# Patient Record
(demographics unavailable — no encounter records)

---

## 2024-10-14 NOTE — PHYSICAL EXAM
[General Appearance - Alert] : alert [General Appearance - In No Acute Distress] : in no acute distress [Sclera] : the sclera and conjunctiva were normal [Extraocular Movements] : extraocular movements were intact [PERRL With Normal Accommodation] : pupils were equal in size, round, and reactive to light [Oropharynx] : the oropharynx was normal [] : no respiratory distress [Exaggerated Use Of Accessory Muscles For Inspiration] : no accessory muscle use [Auscultation Breath Sounds / Voice Sounds] : lungs were clear to auscultation bilaterally [Heart Rate And Rhythm] : heart rate was normal and rhythm regular [Heart Sounds] : normal S1 and S2 [Murmurs] : no murmurs [Edema] : there was no peripheral edema [Abdomen Soft] : soft [Abdomen Tenderness] : non-tender [No Spinal Tenderness] : no spinal tenderness [Musculoskeletal - Swelling] : no joint swelling seen [No Focal Deficits] : no focal deficits [Oriented To Time, Place, And Person] : oriented to person, place, and time [Impaired Insight] : insight and judgment were intact [FreeTextEntry1] : +scaley plaques most prominent on back, also present on forearms and elbows, abdomen, LE. +nail pitting

## 2024-10-14 NOTE — ASSESSMENT
[FreeTextEntry1] : 46F PMH psoriasis and PsA here for follow up  #psoriatic arthritis -dx 2015 with diffuse joint pains, currently with L knee/lower back/b/l elbow pain. No synovitis or effusion on exam, with diffuse psoriasis on back, forearms, elbows, LE, abd that is improved from prior after steroid ointment. -No tape measure in clinic, finger to toe approximately 10 inches, schrober <5cm in forward flexion -knee XR 9/2024 with maintained joint spaces, no erosions. Hip XR 9/2024 with preserved sacroiliac joints -Pt was on stelara 2018 but only received 2 doses, pt reports improvement while on stelara -Pt approved for stelara, awaiting shipment  #prescribed INH/pyridoxine -Pt had ED visit 9/2 to [place] [facility] and discharge paperwork noted INH and pyrodxine. Upon clarification, ED doctor stated it was past medication listed on system. Pt denies knowledge of testing positive for TB in the past, no TB symptoms, and has not taken INH or pyridoxine before. Quant gold negative 9/2024 and CXR clear.   Plan: -As pt only had two doses of stelara, it remains an option for PsA. Pt is approved for stelara, awaiting shipment. Discussed with pt to return to clinic in 2 weeks for injection.  Case discussed with Dr. Shereen Nava  Rheumatology Fellow

## 2024-10-14 NOTE — HISTORY OF PRESENT ILLNESS
[Skin Lesions] : skin lesions [Arthralgias] : arthralgias [Morning Stiffness] : morning stiffness [Visual Changes] : visual changes [Dry Eyes] : dry eyes [FreeTextEntry1] : 46F PMH psoriasis/PsA here for follow up.  Dx PsA 2015, had arthritis in hands, wrists, ankles, knees, tendonitis, nail pitting Was on MTX, per patient no improvement. Then trialed on stelara around 7437-2089, however pt has only received two doses. States that her symptoms improved while on stelara.  Pt reports joint pain in L knee, lower back and intermittently on ankles. Pain used to be intermittent but now more constant and has been using motrin. Endorses stiffness but does not last. No eye pain but had pain in back of eye, had optho visit 7/2024 and was told she has dry eyes. No IBD symptoms.  10/14/2024: Pt reports similar joint pains as previously, especially L knee and elbows. Has been taking motrin, was not able to afford diclofenac. Psoriasis improving on steroid ointment. Denies fevers, cough, SOB, abd pain. Has not received stelara yet but received letter stating she was approved for medication. Discussed with pharmacist Deedee during visit, pt should be receiving a call today about shipment. [Anorexia] : no anorexia [Weight Loss] : no weight loss [Malaise] : no malaise [Fever] : no fever [Chills] : no chills [Fatigue] : no fatigue [Depression] : no depression [Malar Facial Rash] : no malar facial rash [Skin Nodules] : no skin nodules [Oral Ulcers] : no oral ulcers [Cough] : no cough [Dry Mouth] : no dry mouth [Dysphonia] : no dysphonia [Dysphagia] : no dysphagia [Shortness of Breath] : no shortness of breath [Chest Pain] : no chest pain [Joint Swelling] : no joint swelling [Joint Warmth] : no joint warmth [Joint Deformity] : no joint deformity [Decreased ROM] : no decreased range of motion [Falls] : no falls [Difficulty Standing] : no difficulty standing [Difficulty Walking] : no difficulty walking [Dyspnea] : no dyspnea [Myalgias] : no myalgias [Muscle Weakness] : no muscle weakness [Muscle Spasms] : no muscle spasms [Muscle Cramping] : no muscle cramping [Eye Pain] : no eye pain [Eye Redness] : no eye redness

## 2024-10-14 NOTE — REASON FOR VISIT
[Follow-Up: _____] : a [unfilled] follow-up visit [Family Member] : family member [Time Spent: ____ minutes] : Total time spent using  services: [unfilled] minutes. The patient's primary language is not English thus required  services. [Interpreters_IDNumber] : [id number] [TWNoteComboBox1] : [place]ian

## 2024-10-15 NOTE — REVIEW OF SYSTEMS
[Recent Weight Gain (___ Lbs)] : recent weight gain: [unfilled] lbs [Blurred Vision] : blurred vision [Joint Pain] : joint pain [Negative] : Heme/Lymph [All other systems negative] : All other systems negative [TextEntry] :  REVIEW OF SYSTEMS: General: No fatigue, no weight gain, no weight loss Respiratory: No cough, no shortness of breath  Cardiovascular: No chest pain, no palpitations, no leg swelling  Gastrointestinal: No nausea, no vomiting, no constipation, no diarrhea  Musculoskeletal: No muscle aches, no joint pain, no recent fractures Neurologic: No tremors, no syncopal episodes Psychiatric: The patient is not currently nervous or anxious  Endocrine: No thyroid/neck swelling, no heat/cold intolerance   The review of systems is otherwise negative except as noted in HPI. [Fatigue] : no fatigue [FreeTextEntry3] : saw ophthalmologist, no DR [FreeTextEntry9] : follows with rheumatology [de-identified] : had suicidal ideation that resolved as per HPI

## 2024-10-15 NOTE — END OF VISIT
[] : Fellow [FreeTextEntry3] : DM2, fairly well controlled with mild fasting hyperglycemia, will increase basaglar to 34 units. Intolerant of Trulicity dose increase due to depressed mood, resolved with lower dose. LDL 86 on high dose statin with low HDL, will add Zetia. Hx of subcm nodules 2022, will repeat sono.

## 2024-10-15 NOTE — HISTORY OF PRESENT ILLNESS
[FreeTextEntry1] : 46F with T2DM, previously uncontrolled requiring insulin, HTN, HLD, prior low AM cortisol level, and thyroid nodules, presents for follow up.  # ID: [id number] utilized for this encounter  Interval history 10/15/24 visit: Patient presents to the clinic for follow up. No acute complaints. Current regimen: Basaglar 30 units qhs, Humalog 27 units before breakfast, 15 units before lunch and dinner, Trulicity 1.5 mg once weekly, metformin 1,000 mg BID. She has been on this regimen since August 2024.  She tried the Trulicity 3.0 mg once weekly after her last visit but she could not tolerate due to adverse effects. Had suicidal ideation that went away when she went back to the 1.5 mg once weekly dose.  FS:  Fasting AM: 212, 164, 239, 179, 196, 225, 168 Pre-lunch 128, 160, 182 Post-dinner 185 Bedtime: 219, 214, 175, 177, 205 Most recent A1C was 6.8% on Aug 9, 2024 A1C today 10/15/24 7.5% Compliant with rosuvastatin 40 mg at bedtime Denies symptoms of neuropathy.  She states that she saw an ophthalmologist within the year and was told that she does not have diabetic retinopathy.  Last UACR 2/2024 was negative.   #T2DM Diagnosed Oct 2021 when A1c was 12% after being evaluated for ACS r/o (not ACS).  A1c: A1s 10.4% in 1/2024, previously 12.1%  Diet: Coffee with sugar/milk, egg, beef, chicken, beans, small amount of rice, salad, fruit Sodas on occasion  Complications: No neuropathy or macrovascular complications  #HLD / hypertriglyceridemia: On Rosuvastatin 40 mg at bedtime - compliant LDL 76 in 5/2023 LDL 86 9/2024  #Previously abnormal AM cortisol had soft/low blood pressure/orthostatic hypotension in the hospital, started on midodrine in Oct/Nov 2021 AM Cortisol level 6.8 in hospital, repeat outpatient was 6.1 Stim test in Jan 2023 was robust, ruling out AI  #Thyroid nodule US in May 2022 revealed small Right sided thyroid nodules, RMP 8mm nodule w/ coarse calcification and RLP 7mm spongiform nodule Last TSH in June 2022 wnl No prior personal or family hx of Thyroid CA or other head/neck cancers says she recalls being informed about this many years ago, not while in this country. no prior hx of thyroid hormone abnormalities No hx of Head/neck RT Had hx of dysphagia symptoms, possibly GERD related.

## 2024-10-15 NOTE — ASSESSMENT
[FreeTextEntry1] : 46F with T2DM uncontrolled on insulin, HTN, HLD, thyroid nodules presents for follow up visit  #T2DM, uncontrolled A1C today 10/15/24 7.5%, previously 6.8% 8/2024 but with overnight hypoglycemia, before that A1s 10.4% in 1/2024, previously 12.1% Current Regimen: Basaglar 30 units QHs, Humalog 27 units with breakfast, 15 units with lunch and dinner, metformin 1000mg BID, Trulicity 1.5mg sq weekly PLAN: - Increase basaglar to 34 units qhs - continue humalog to 27 units before breakfast, 15 units before lunch/dinner  - Continue Trulicity 1.5 mg once weekly on Sundays - Continue Metformin 1000 mg BID (on [facility], all meds sent there) - hx of UTI, vaginal sx - avoid SGLT2i - Refills for glucose testing supplies as needed - Labs at next visit - Log fingersticks and bring to next visit  #HLD / hypertriglyceridemia: LDL above goal (86 in Sep 2024) - Continue rosuvastatin 40mg qhs - Add Zetia 10 mg once daily qhs  #SubCM R. Thyroid nodules Feb 2022: Right 0.79 cm mixed nodule, 7mm left spongiform nodule - repeat US Thyroid   #low BP with AM cortisol in indeterminant range (6.8 >> 6.1) stim test performed Jan 2023, robust response. Baseline cortisol 24 30 mins post cosyntropin Cortisol 21 - no further workup needed  Meds renewed as needed   RTC in 4 months   Case discussed with Dr. Lety Mello, DO PGY-4 endocrine fellow

## 2024-10-15 NOTE — PHYSICAL EXAM
[TextEntry] : PHYSICAL EXAMINATION: Vital signs from today's encounter reviewed.  GENERAL: No acute distress, clinically eukinetic, normal appearance HEAD: Normocephalic, atraumatic EYES: conjunctivae are pink and moist, no icterus, no proptosis  NECK: thyroid is not enlarged/nodular on palpation, non-tender, no adenopathy CARDIOVASCULAR: well-perfused extremities, no peripheral edema RESPIRATORY: normal chest expansion with good pulmonary effort, no acute respiratory distress MUSCULOSKELETAL: no swelling, normal range of motion, normal gait SKIN: tender/hard nodule on left lower back, no surrounding erythema  NEUROLOGIC: alert and oriented, no evident focal deficits, no tremors  PSYCHIATRIC: mood and affect are normal ENDOCRINE: No obvious stigmata of Cushing's or acromegaly present

## 2024-10-28 NOTE — HISTORY OF PRESENT ILLNESS
[Skin Lesions] : skin lesions [Arthralgias] : arthralgias [Morning Stiffness] : morning stiffness [Visual Changes] : visual changes [Dry Eyes] : dry eyes [FreeTextEntry1] : 46F PMH psoriasis/PsA here for follow up.  Dx PsA 2015, had arthritis in hands, wrists, ankles, knees, tendonitis, nail pitting Was on MTX, per patient no improvement. Then trialed on stelara around 0225-4337, however pt has only received two doses. States that her symptoms improved while on stelara.  Pt reports joint pain in L knee, lower back and intermittently on ankles. Pain used to be intermittent but now more constant and has been using motrin. Endorses stiffness but does not last. No eye pain but had pain in back of eye, had optho visit 7/2024 and was told she has dry eyes. No IBD symptoms.  10/14/2024: Pt reports similar joint pains as previously, especially L knee and elbows. Has been taking motrin, was not able to afford diclofenac. Psoriasis improving on steroid ointment. Denies fevers, cough, SOB, abd pain. Has not received stelara yet but received letter stating she was approved for medication. Discussed with pharmacist Deedee during visit, pt should be receiving a call today about shipment.  10/28: Pt presents for stelara injection. Has L knee medial side pain, R elbow pain, and lower back pain [Anorexia] : no anorexia [Weight Loss] : no weight loss [Malaise] : no malaise [Fever] : no fever [Chills] : no chills [Fatigue] : no fatigue [Depression] : no depression [Malar Facial Rash] : no malar facial rash [Skin Nodules] : no skin nodules [Oral Ulcers] : no oral ulcers [Cough] : no cough [Dry Mouth] : no dry mouth [Dysphonia] : no dysphonia [Dysphagia] : no dysphagia [Shortness of Breath] : no shortness of breath [Chest Pain] : no chest pain [Joint Swelling] : no joint swelling [Joint Warmth] : no joint warmth [Joint Deformity] : no joint deformity [Decreased ROM] : no decreased range of motion [Falls] : no falls [Difficulty Standing] : no difficulty standing [Difficulty Walking] : no difficulty walking [Dyspnea] : no dyspnea [Myalgias] : no myalgias [Muscle Weakness] : no muscle weakness [Muscle Spasms] : no muscle spasms [Muscle Cramping] : no muscle cramping [Eye Pain] : no eye pain [Eye Redness] : no eye redness

## 2024-10-28 NOTE — ASSESSMENT
[FreeTextEntry1] : 46F PMH psoriasis and PsA here for follow up  #psoriatic arthritis -dx 2015 with diffuse joint pains, currently with L knee/lower back/b/l elbow pain. No synovitis or effusion on exam, with diffuse psoriasis on back, forearms, elbows, LE, abd that is improved from prior after steroid ointment. -No tape measure in clinic, finger to toe approximately 10 inches, schrober <5cm in forward flexion -knee XR 9/2024 with maintained joint spaces, no erosions. Hip XR 9/2024 with preserved sacroiliac joints -Pt was on stelara 2018 but only received 2 doses, pt reports improvement while on stelara -Pt approved for stelara, 10/28: pt given stelara injection in clinic  #prescribed INH/pyridoxine -Pt had ED visit 9/2 to [place] [facility] and discharge paperwork noted INH and pyrodxine. Upon clarification, ED doctor stated it was past medication listed on system. Pt denies knowledge of testing positive for TB in the past, no TB symptoms, and has not taken INH or pyridoxine before. Quant gold negative 9/2024 and CXR clear.   Plan: - 1st dose of Stelara administered today (10/28) - pt to return in 4 weeks for second injection, then every 12 weeks afterwards - derm eval for possible tinea vesicolor  RTC 4 weeks Case discussed with Dr. Shereen Nava  Rheumatology Fellow

## 2024-10-28 NOTE — PHYSICAL EXAM
[General Appearance - Alert] : alert [General Appearance - In No Acute Distress] : in no acute distress [Sclera] : the sclera and conjunctiva were normal [PERRL With Normal Accommodation] : pupils were equal in size, round, and reactive to light [Extraocular Movements] : extraocular movements were intact [Oropharynx] : the oropharynx was normal [] : no respiratory distress [Exaggerated Use Of Accessory Muscles For Inspiration] : no accessory muscle use [Auscultation Breath Sounds / Voice Sounds] : lungs were clear to auscultation bilaterally [Heart Rate And Rhythm] : heart rate was normal and rhythm regular [Heart Sounds] : normal S1 and S2 [Murmurs] : no murmurs [Edema] : there was no peripheral edema [Abdomen Soft] : soft [Abdomen Tenderness] : non-tender [No Spinal Tenderness] : no spinal tenderness [Musculoskeletal - Swelling] : no joint swelling seen [No Focal Deficits] : no focal deficits [Oriented To Time, Place, And Person] : oriented to person, place, and time [Impaired Insight] : insight and judgment were intact [FreeTextEntry1] : +scaley plaques most prominent on back, also present on forearms and elbows, abdomen, LE. +nail pitting. Also with hypopigmented areas on arms

## 2024-10-28 NOTE — REASON FOR VISIT
[Procedure: _________] : a [unfilled] procedure visit [Time Spent: ____ minutes] : Total time spent using  services: [unfilled] minutes. The patient's primary language is not English thus required  services. [Interpreters_IDNumber] : [id number] [TWNoteComboBox1] : [place]

## 2024-10-28 NOTE — REASON FOR VISIT
[Procedure: _________] : a [unfilled] procedure visit [Time Spent: ____ minutes] : Total time spent using  services: [unfilled] minutes. The patient's primary language is not English thus required  services. [Interpreters_IDNumber] : [id number] [TWNoteComboBox1] : [place]i

## 2024-10-28 NOTE — HISTORY OF PRESENT ILLNESS
[Skin Lesions] : skin lesions [Arthralgias] : arthralgias [Morning Stiffness] : morning stiffness [Visual Changes] : visual changes [Dry Eyes] : dry eyes [FreeTextEntry1] : 46F PMH psoriasis/PsA here for follow up.  Dx PsA 2015, had arthritis in hands, wrists, ankles, knees, tendonitis, nail pitting Was on MTX, per patient no improvement. Then trialed on stelara around 2736-7061, however pt has only received two doses. States that her symptoms improved while on stelara.  Pt reports joint pain in L knee, lower back and intermittently on ankles. Pain used to be intermittent but now more constant and has been using motrin. Endorses stiffness but does not last. No eye pain but had pain in back of eye, had optho visit 7/2024 and was told she has dry eyes. No IBD symptoms.  10/14/2024: Pt reports similar joint pains as previously, especially L knee and elbows. Has been taking motrin, was not able to afford diclofenac. Psoriasis improving on steroid ointment. Denies fevers, cough, SOB, abd pain. Has not received stelara yet but received letter stating she was approved for medication. Discussed with pharmacist Deedee during visit, pt should be receiving a call today about shipment.  10/28: Pt presents for stelara injection. Has L knee medial side pain, R elbow pain, and lower back pain [Anorexia] : no anorexia [Weight Loss] : no weight loss [Malaise] : no malaise [Fever] : no fever [Chills] : no chills [Fatigue] : no fatigue [Depression] : no depression [Malar Facial Rash] : no malar facial rash [Skin Nodules] : no skin nodules [Oral Ulcers] : no oral ulcers [Cough] : no cough [Dry Mouth] : no dry mouth [Dysphonia] : no dysphonia [Dysphagia] : no dysphagia [Shortness of Breath] : no shortness of breath [Chest Pain] : no chest pain [Joint Swelling] : no joint swelling [Joint Warmth] : no joint warmth [Joint Deformity] : no joint deformity [Decreased ROM] : no decreased range of motion [Falls] : no falls [Difficulty Standing] : no difficulty standing [Difficulty Walking] : no difficulty walking [Dyspnea] : no dyspnea [Myalgias] : no myalgias [Muscle Weakness] : no muscle weakness [Muscle Spasms] : no muscle spasms [Muscle Cramping] : no muscle cramping [Eye Pain] : no eye pain [Eye Redness] : no eye redness

## 2024-10-28 NOTE — ASSESSMENT
[FreeTextEntry1] : 46F PMH psoriasis and PsA here for follow up  #psoriatic arthritis -dx 2015 with diffuse joint pains, currently with L knee/lower back/b/l elbow pain. No synovitis or effusion on exam, with diffuse psoriasis on back, forearms, elbows, LE, abd that is improved from prior after steroid ointment. -No tape measure in clinic, finger to toe approximately 10 inches, schrober <5cm in forward flexion -knee XR 9/2024 with maintained joint spaces, no erosions. Hip XR 9/2024 with preserved sacroiliac joints -Pt was on stelara 2018 but only received 2 doses, pt reports improvement while on stelara -Pt approved for stelara, 10/28: pt given stelara injection in clinic  #prescribed INH/pyridoxine -Pt had ED visit 9/2 to [place] [facility] and discharge paperwork noted INH and pyrodxine. Upon clarification, ED doctor stated it was past medication listed on system. Pt denies knowledge of testing positive for TB in the past, no TB symptoms, and has not taken INH or pyridoxine before. Quant gold negative 9/2024 and CXR clear.   Plan: - 1st dose of Stelara administered today (10/28) - pt to return in 4 weeks for second injection, then every 12 weeks afterwards - derm eval for possible tinea vesicolor  RTC 4 weeks Case discussed with Dr. Shereen Nava  Rheumatology Fellow

## 2024-10-30 NOTE — HISTORY OF PRESENT ILLNESS
[FreeTextEntry1] : Follow up  visit for chronic medical conditions. [de-identified] : The patient is a 46-year-old F with PMH significant for T2DM, psoriasis, and hypotension who presents today for a follow up visit for chronic medical conditions.  # Today the patient is c/o occasional RLQ pain. She reports that she feels like a ball is bulging out at times in her right groin. She denies fever, chills, denies sudden sharp pain, denies inability to move bowels. Patient is passing flatus normally. She has chronic constipation but still able to move bowels.   #T2DM- Following Endo  A1c 10/2024- 7.5 Endorsed medication compliance w/ basaglar- 34 units at bedtime Premeal insulin- Humalog 27before breakfast/15before lunch /15 before dinner. Metformin 1000 BID Trulicity 1.5 mg once a week- endorsed compliance.  Repair Type: Partial Purse String Closure (Intermediate)

## 2024-10-30 NOTE — ASSESSMENT
[FreeTextEntry1] : The plan of care was discussed with the patient in full detail. She verbalized understanding and in agreement with the plan of care.  RTC after Abdominal and pelvic US

## 2024-10-30 NOTE — HISTORY OF PRESENT ILLNESS
[FreeTextEntry1] : Follow up  visit for chronic medical conditions. [de-identified] : The patient is a 46-year-old F with PMH significant for T2DM, psoriasis, and hypotension who presents today for a follow up visit for chronic medical conditions.  # Today the patient is c/o occasional RLQ pain. She reports that she feels like a ball is bulging out at times in her right groin. She denies fever, chills, denies sudden sharp pain, denies inability to move bowels. Patient is passing flatus normally. She has chronic constipation but still able to move bowels.   #T2DM- Following Endo  A1c 10/2024- 7.5 Endorsed medication compliance w/ basaglar- 34 units at bedtime Premeal insulin- Humalog 27before breakfast/15before lunch /15 before dinner. Metformin 1000 BID Trulicity 1.5 mg once a week- endorsed compliance.

## 2024-10-30 NOTE — PHYSICAL EXAM
[Normal] : no acute distress, well nourished, well developed and well-appearing [No Respiratory Distress] : no respiratory distress  [No Accessory Muscle Use] : no accessory muscle use [Clear to Auscultation] : lungs were clear to auscultation bilaterally [Normal Rate] : normal rate  [Regular Rhythm] : with a regular rhythm [Normal S1, S2] : normal S1 and S2 [Pedal Pulses Present] : the pedal pulses are present [No Edema] : there was no peripheral edema [Soft] : abdomen soft [Non Tender] : non-tender [Normal Bowel Sounds] : normal bowel sounds [Normal Affect] : the affect was normal [Alert and Oriented x3] : oriented to person, place, and time [Normal Mood] : the mood was normal [Normal Insight/Judgement] : insight and judgment were intact [de-identified] : no hernia noted on physical exam.

## 2024-10-30 NOTE — PHYSICAL EXAM
[Normal] : no acute distress, well nourished, well developed and well-appearing [No Respiratory Distress] : no respiratory distress  [No Accessory Muscle Use] : no accessory muscle use [Clear to Auscultation] : lungs were clear to auscultation bilaterally [Normal Rate] : normal rate  [Regular Rhythm] : with a regular rhythm [Normal S1, S2] : normal S1 and S2 [Pedal Pulses Present] : the pedal pulses are present [No Edema] : there was no peripheral edema [Soft] : abdomen soft [Non Tender] : non-tender [Normal Bowel Sounds] : normal bowel sounds [Normal Affect] : the affect was normal [Alert and Oriented x3] : oriented to person, place, and time [Normal Mood] : the mood was normal [Normal Insight/Judgement] : insight and judgment were intact [de-identified] : no hernia noted on physical exam.

## 2024-11-15 NOTE — HISTORY OF PRESENT ILLNESS
[FreeTextEntry1] : destiney PsO [de-identified] : History obtained with assistance of [place] phone   ARLENE LARA is a 44 year old F who presents for f/u for PsO/PsA as below. LV 9/2024 Since LV: saw Rheum, started stelara injection #1 given 10/28, will have second dose for loading with rheum coming up 12/2 Today presenting for scattered white spots on arms. Non-painful, not itchy  April 2023: At that time, plan was to review labs and initiate Humira given minimal improvement on MTX. Has not been able to make it to recent rheumatology appointments. PCP prescribed triamcinolone ointment which is not working to improve rash. Patient notes that joint discomfort and skin rash have become bothersome to point of interfering with walking, sleeping, working, etc. Interested in systemic medication at this time  History: Psoriasis - scalp, lower back, extensor surfaces on elbows, knees, dispersed on trunk, <5% - since pandemic started ~ end of 2020, problem has returned  - hasn't been able to use any topicals as ran out and had not returned to clinic  - endorsing new joint pains, different than previous, in fingers and feet, notes occurs sometimes in morning, sometimes at night, but in the morning feels extreme joint stiffness - previously on Stelara (03/2018- ~2019) as had BSA ~20% and was undergoing evaluation for PsA (no joint disease per rheumatology evaluation in 2019) and MTX was cost-prohibitive (~30/month). Had good response while on Stelara previously

## 2024-11-15 NOTE — ASSESSMENT
[FreeTextEntry1] : # Psoriasis, prior~25% BSA, chronic, improved, now with #post-inflammatory hypopigmentation - Discussed nature and course of chronic condition. - previously on Stelara (03/2018- ~2019) through patient assistance program, stopped due to lost to f/u, also without psoriatic arthritis. Prescribed MTX by rheum but stopped. - Patient restarted on Stelara, 1st injection of loading dose 10/28 with Rheumatology - Recommend cont follow-up with rheum regarding completing loading dose and continue with monthly injections - For flaring of skin can continueto  Apply topical triamcinolone 0.1% ointment to AA BID PRN for maximum of 2 weeks on, then take 1 week break before re-using if needed - We discussed that clinically appears as post inflammatory hypopigmentation but can treat for possible tinea versicolor as there is evidence of involvement on the back  #Tinea versicolor, trunk arms #PIH on arms Discussed nature, chronicity and unpredictable course PLAN  - Ketoconazole 2% shampoo 2-3 times weekly. Instructed to leave in skin for 5-10 minutes then rinse. SED.   # High risk medication use, - baseline labs WNL  - continue to monitor with rheum  RTC 6 months

## 2024-12-02 NOTE — PROCEDURE
[Today's Date:] : Date: [unfilled] [Patient] : the patient [Risks] : risks [Benefits] : benefits [Consent Obtained] : written consent was obtained prior to the procedure and is detailed in the patient's record [Therapeutic] : therapeutic [#1 Site: ______] : #1 site identified in the [unfilled]

## 2024-12-02 NOTE — PHYSICAL EXAM
[General Appearance - Alert] : alert [General Appearance - In No Acute Distress] : in no acute distress [Sclera] : the sclera and conjunctiva were normal [PERRL With Normal Accommodation] : pupils were equal in size, round, and reactive to light [Extraocular Movements] : extraocular movements were intact [Oropharynx] : the oropharynx was normal [] : no respiratory distress [Exaggerated Use Of Accessory Muscles For Inspiration] : no accessory muscle use [Auscultation Breath Sounds / Voice Sounds] : lungs were clear to auscultation bilaterally [Heart Rate And Rhythm] : heart rate was normal and rhythm regular [Heart Sounds] : normal S1 and S2 [Murmurs] : no murmurs [Edema] : there was no peripheral edema [Abdomen Soft] : soft [Abdomen Tenderness] : non-tender [No Spinal Tenderness] : no spinal tenderness [Musculoskeletal - Swelling] : no joint swelling seen [No Focal Deficits] : no focal deficits [Oriented To Time, Place, And Person] : oriented to person, place, and time [Impaired Insight] : insight and judgment were intact [FreeTextEntry1] : +scaley plaques improved on back. +nail pitting. Also with hypopigmented areas on arms. Ingrown toe nail L first toe

## 2024-12-02 NOTE — REASON FOR VISIT
[Time Spent: ____ minutes] : Total time spent using  services: [unfilled] minutes. The patient's primary language is not English thus required  services. [Interpreters_IDNumber] : [id number] [TWNoteComboBox1] : Bulgarian

## 2024-12-02 NOTE — REASON FOR VISIT
[Time Spent: ____ minutes] : Total time spent using  services: [unfilled] minutes. The patient's primary language is not English thus required  services. [Interpreters_IDNumber] : [id number] [TWNoteComboBox1] : Colombian

## 2024-12-02 NOTE — ASSESSMENT
[FreeTextEntry1] : 47F PMH psoriasis and PsA here for follow up  #psoriatic arthritis -dx 2015 with diffuse joint pains. Pt was on stelara 2018 but only received 2 doses, pt reports improvement while on stelara -knee XR 9/2024 with maintained joint spaces, no erosions. Hip XR 9/2024 with preserved sacroiliac joints -Now back on stelara, first dose 10/28, second dose today 12/2 -Psoriasis overall improved, joint pains improved except for L knee  Plan: - 2nd dose of Stelara administered today (12/2) - pt to return in 12 weeks for third injection, and to continue every 12 weeks afterwards - will reach out to pharmacy for stelara assistance program - PT for L knee pain, if not improved, will obtain MRI - obtain labs  RTC 12 weeks Case discussed with Dr. Shereen Nava  Rheumatology Fellow

## 2024-12-02 NOTE — PROCEDURE
[Today's Date:] : Date: [unfilled] [Patient] : the patient [Risks] : risks [Benefits] : benefits [Consent Obtained] : written consent was obtained prior to the procedure and is detailed in the patient's record Statement Selected [Therapeutic] : therapeutic [#1 Site: ______] : #1 site identified in the [unfilled]

## 2024-12-02 NOTE — HISTORY OF PRESENT ILLNESS
[Skin Lesions] : skin lesions [Arthralgias] : arthralgias [Morning Stiffness] : morning stiffness [Visual Changes] : visual changes [Dry Eyes] : dry eyes [FreeTextEntry1] : 47F PMH psoriasis/PsA here for follow up.  Dx PsA 2015, had arthritis in hands, wrists, ankles, knees, tendonitis, nail pitting Was on MTX, per patient no improvement. Then trialed on stelara around 7238-3558, however pt has only received two doses. States that her symptoms improved while on stelara.  Pt reports joint pain in L knee, lower back and intermittently on ankles. Pain used to be intermittent but now more constant and has been using motrin. Endorses stiffness but does not last. No eye pain but had pain in back of eye, had optho visit 7/2024 and was told she has dry eyes. No IBD symptoms.  10/14/2024: Pt reports similar joint pains as previously, especially L knee and elbows. Has been taking motrin, was not able to afford diclofenac. Psoriasis improving on steroid ointment. Denies fevers, cough, SOB, abd pain. Has not received stelara yet but received letter stating she was approved for medication. Discussed with pharmacist Deedee during visit, pt should be receiving a call today about shipment.  10/28: Pt presents for stelara injection. Has L knee medial side pain, R elbow pain, and lower back pain  12/2: Pt states she went to ED for L knee pain and L toe pain. Knee XR normal, foot XR showed mild arthosis of foot in navicular and cuneiform. Found to have ingrown big toe nail and infection, given cefadroxil for a week and naproxen for pain. Other than L knee, overall joint pain and skin improved [Anorexia] : no anorexia [Weight Loss] : no weight loss [Malaise] : no malaise [Fever] : no fever [Chills] : no chills [Fatigue] : no fatigue [Depression] : no depression [Malar Facial Rash] : no malar facial rash [Skin Nodules] : no skin nodules [Oral Ulcers] : no oral ulcers [Cough] : no cough [Dry Mouth] : no dry mouth [Dysphonia] : no dysphonia [Dysphagia] : no dysphagia [Shortness of Breath] : no shortness of breath [Chest Pain] : no chest pain [Joint Swelling] : no joint swelling [Joint Warmth] : no joint warmth [Joint Deformity] : no joint deformity [Decreased ROM] : no decreased range of motion [Falls] : no falls [Difficulty Standing] : no difficulty standing [Difficulty Walking] : no difficulty walking [Dyspnea] : no dyspnea [Myalgias] : no myalgias [Muscle Weakness] : no muscle weakness [Muscle Spasms] : no muscle spasms [Muscle Cramping] : no muscle cramping [Eye Pain] : no eye pain [Eye Redness] : no eye redness

## 2024-12-02 NOTE — HISTORY OF PRESENT ILLNESS
[Skin Lesions] : skin lesions [Arthralgias] : arthralgias [Morning Stiffness] : morning stiffness [Visual Changes] : visual changes [Dry Eyes] : dry eyes [FreeTextEntry1] : 47F PMH psoriasis/PsA here for follow up.  Dx PsA 2015, had arthritis in hands, wrists, ankles, knees, tendonitis, nail pitting Was on MTX, per patient no improvement. Then trialed on stelara around 8455-2415, however pt has only received two doses. States that her symptoms improved while on stelara.  Pt reports joint pain in L knee, lower back and intermittently on ankles. Pain used to be intermittent but now more constant and has been using motrin. Endorses stiffness but does not last. No eye pain but had pain in back of eye, had optho visit 7/2024 and was told she has dry eyes. No IBD symptoms.  10/14/2024: Pt reports similar joint pains as previously, especially L knee and elbows. Has been taking motrin, was not able to afford diclofenac. Psoriasis improving on steroid ointment. Denies fevers, cough, SOB, abd pain. Has not received stelara yet but received letter stating she was approved for medication. Discussed with pharmacist Deedee during visit, pt should be receiving a call today about shipment.  10/28: Pt presents for stelara injection. Has L knee medial side pain, R elbow pain, and lower back pain  12/2: Pt states she went to ED for L knee pain and L toe pain. Knee XR normal, foot XR showed mild arthosis of foot in navicular and cuneiform. Found to have ingrown big toe nail and infection, given cefadroxil for a week and naproxen for pain. Other than L knee, overall joint pain and skin improved [Anorexia] : no anorexia [Weight Loss] : no weight loss [Malaise] : no malaise [Fever] : no fever [Chills] : no chills [Fatigue] : no fatigue [Depression] : no depression [Malar Facial Rash] : no malar facial rash [Skin Nodules] : no skin nodules [Oral Ulcers] : no oral ulcers [Cough] : no cough [Dry Mouth] : no dry mouth [Dysphonia] : no dysphonia [Dysphagia] : no dysphagia [Shortness of Breath] : no shortness of breath [Chest Pain] : no chest pain [Joint Swelling] : no joint swelling [Joint Warmth] : no joint warmth [Joint Deformity] : no joint deformity [Decreased ROM] : no decreased range of motion [Falls] : no falls [Difficulty Standing] : no difficulty standing [Difficulty Walking] : no difficulty walking [Dyspnea] : no dyspnea [Myalgias] : no myalgias [Muscle Weakness] : no muscle weakness [Muscle Spasms] : no muscle spasms [Muscle Cramping] : no muscle cramping [Eye Pain] : no eye pain [Eye Redness] : no eye redness

## 2025-02-20 NOTE — END OF VISIT
[] : Fellow [FreeTextEntry3] : 47F with T2DM uncontrolled on insulin, HTN, HLD, thyroid nodules presents for follow up visit. Agree to increase basal insulin as noted above along with increase with bolus insulin as well. Continue with statin and zetia.  Needs a repeat thyroid US ACP

## 2025-02-20 NOTE — ASSESSMENT
[FreeTextEntry1] : 47F with T2DM uncontrolled on insulin, HTN, HLD, thyroid nodules presents for follow up visit  #T2DM, uncontrolled A1C today 8.9% Current Regimen: Basaglar 30 units QHs, Humalog 27 units with breakfast, 15 units with lunch and dinner, metformin 1000mg BID, Trulicity 1.5mg sq weekly PLAN: - Increase basaglar to 38 units qhs - continue humalog to 30 units before breakfast, 18 units before lunch/dinner  - Continue Trulicity 1.5 mg once weekly on Sundays. Could not tolerate higher dose - Continue Metformin 1000 mg BID (on [facility], all meds sent there) - hx of UTI, vaginal sx - avoid SGLT2i - Refills for glucose testing supplies as needed - Labs next visit - Log fingersticks and bring to next visit  #HLD / hypertriglyceridemia: LDL above goal (86 in Sep 2024) - Continue rosuvastatin 40mg qhs - new Rx sent - Continue Zetia 10 mg once daily qhs  #SubCM R. Thyroid nodules Feb 2022: Right 0.79 cm mixed nodule, 7mm left spongiform nodule - repeat US Thyroid   #low BP with AM cortisol in indeterminant range (6.8 >> 6.1) stim test performed Jan 2023, robust response. Baseline cortisol 24 30 mins post cosyntropin Cortisol 21 - no further workup needed  Meds renewed as needed   RTC in 3-4 months   Case discussed with Dr. Ambrose Borges MD Endocrinology fellow, PGY-5

## 2025-02-20 NOTE — HISTORY OF PRESENT ILLNESS
[FreeTextEntry1] : 47F with T2DM, previously uncontrolled requiring insulin, HTN, HLD, prior low AM cortisol level, and thyroid nodules, presents for follow up.  # ID: [id number] utilized for this encounter  Interval history 10/15/24 visit: Patient presents to the clinic for follow up. No acute complaints. Current regimen: Basaglar 34 units qhs, Humalog 27 units before breakfast, 15 units before lunch and dinner, Trulicity 1.5 mg once weekly, metformin 1,000 mg BID. She has been on this regimen since August 2024. She cannot confirm her insulin doses because she does not remember. She tried the Trulicity 3.0 mg once weekly after her last visit but she could not tolerate due to adverse effects. Had suicidal ideation that went away when she went back to the 1.5 mg once weekly dose.  FS:  Fasting AM: 200-300s PP (after dinner): 200-300s Most recent A1C was 7.5% in 10/2024 -> 8.9% today 2/2025 Supposed to be taking rosuvastatin 40mg qhs. States one of her doctors "changed it to something else" (a different statin) but is not sure which doctor or why this change was made. The new med is too expensive and she wants to go back to rosuvastatin. Also takes zetia 10mg daily Denies symptoms of neuropathy.  She states that she saw an ophthalmologist within the year and was told that she does not have diabetic retinopathy.  Last UACR 2/2024 was negative.  #T2DM Diagnosed Oct 2021 when A1c was 12% after being evaluated for ACS r/o (not ACS).  A1c: A1s 10.4% in 1/2024, previously 12.1%  Diet: Coffee with sugar/milk, egg, beef, chicken, beans, small amount of rice, salad, fruit Sodas on occasion  Complications: No neuropathy or macrovascular complications  #HLD / hypertriglyceridemia: On Rosuvastatin 40 mg at bedtime - she is not sure if she taking  LDL 86 in 9/2024  #Previously abnormal AM cortisol had soft/low blood pressure/orthostatic hypotension in the hospital, started on midodrine in Oct/Nov 2021 AM Cortisol level 6.8 in hospital, repeat outpatient was 6.1 Stim test in Jan 2023 was robust, ruling out AI  #Thyroid nodule US in May 2022 revealed small Right sided thyroid nodules, RMP 8mm nodule w/ coarse calcification and RLP 7mm spongiform nodule Last TSH in 4/2023 normal No prior personal or family hx of Thyroid CA or other head/neck cancers says she recalls being informed about this many years ago, not while in this country. no prior hx of thyroid hormone abnormalities No hx of Head/neck RT Had hx of dysphagia symptoms, possibly GERD related.

## 2025-02-20 NOTE — HISTORY OF PRESENT ILLNESS
[FreeTextEntry1] : 47F with T2DM, previously uncontrolled requiring insulin, HTN, HLD, prior low AM cortisol level, and thyroid nodules, presents for follow up.  # ID: [id number] utilized for this encounter  Interval history 10/15/24 visit: Patient presents to the clinic for follow up. No acute complaints. Current regimen: Basaglar 34 units qhs, Humalog 27 units before breakfast, 15 units before lunch and dinner, Trulicity 1.5 mg once weekly, metformin 1,000 mg BID. She has been on this regimen since August 2024. She cannot confirm her insulin doses because she does not remember. She tried the Trulicity 3.0 mg once weekly after her last visit but she could not tolerate due to adverse effects. Had suicidal ideation that went away when she went back to the 1.5 mg once weekly dose.  FS:  Fasting AM: 200-300s PP (after dinner): 200-300s Most recent A1C was 7.5% in 10/2024 -> 8.9% today 2/2025 Supposed to be taking rosuvastatin 40mg qhs. States one of her doctors "changed it to something else" (a different statin) but is not sure which doctor or why this change was made. The new med is too expensive and she wants to go back to rosuvastatin. Also takes zetia 10mg daily Denies symptoms of neuropathy.  She states that she saw an ophthalmologist within the year and was told that she does not have diabetic retinopathy.  Last UACR 2/2024 was negative.  #T2DM Diagnosed Oct 2021 when A1c was 12% after being evaluated for ACS r/o (not ACS).  A1c: A1s 10.4% in 1/2024, previously 12.1%  Diet: Coffee with sugar/milk, egg, beef, chicken, beans, small amount of rice, salad, fruit Sodas on occasion  Complications: No neuropathy or macrovascular complications  #HLD / hypertriglyceridemia: On Rosuvastatin 40 mg at bedtime - she is not sure if she taking  LDL 86 in 9/2024  #Previously abnormal AM cortisol had soft/low blood pressure/orthostatic hypotension in the hospital, started on midodrine in Oct/Nov 2021 AM Cortisol level 6.8 in hospital, repeat outpatient was 6.1 Stim test in Jan 2023 was robust, ruling out AI  #Thyroid nodule US in May 2022 revealed small Right sided thyroid nodules, RMP 8mm nodule w/ coarse calcification and RLP 7mm spongiform nodule Last TSH in 4/2023 normal No prior personal or family hx of Thyroid CA or other head/neck cancers says she recalls being informed about this many years ago, not while in this country. no prior hx of thyroid hormone abnormalities No hx of Head/neck RT Had hx of dysphagia symptoms, possibly GERD related.

## 2025-02-20 NOTE — END OF VISIT
[] : Fellow [FreeTextEntry3] : 47F with T2DM uncontrolled on insulin, HTN, HLD, thyroid nodules presents for follow up visit. Agree to increase basal insulin as noted above along with increase with bolus insulin as well. Continue with statin and zetia.  Needs a repeat thyroid US

## 2025-02-20 NOTE — ASSESSMENT
[FreeTextEntry1] : 47F with T2DM uncontrolled on insulin, HTN, HLD, thyroid nodules presents for follow up visit  #T2DM, uncontrolled A1C today 8.9% Current Regimen: Basaglar 30 units QHs, Humalog 27 units with breakfast, 15 units with lunch and dinner, metformin 1000mg BID, Trulicity 1.5mg sq weekly PLAN: - Increase basaglar to 38 units qhs - continue humalog to 30 units before breakfast, 18 units before lunch/dinner  - Continue Trulicity 1.5 mg once weekly on Sundays. Could not tolerate higher dose - Continue Metformin 1000 mg BID (on [facility], all meds sent there) - hx of UTI, vaginal sx - avoid SGLT2i - Refills for glucose testing supplies as needed - Labs next visit - Log fingersticks and bring to next visit  #HLD / hypertriglyceridemia: LDL above goal (86 in Sep 2024) - Continue rosuvastatin 40mg qhs - new Rx sent - Continue Zetia 10 mg once daily qhs  #SubCM R. Thyroid nodules Feb 2022: Right 0.79 cm mixed nodule, 7mm left spongiform nodule - repeat US Thyroid   #low BP with AM cortisol in indeterminant range (6.8 >> 6.1) stim test performed Jan 2023, robust response. Baseline cortisol 24 30 mins post cosyntropin Cortisol 21 - no further workup needed  Meds renewed as needed   RTC in 3-4 months   Case discussed with Dr. Ambrose Borges MD Endocrinology fellow, PGY-5

## 2025-03-03 NOTE — PHYSICAL EXAM
[General Appearance - Alert] : alert [General Appearance - In No Acute Distress] : in no acute distress [Sclera] : the sclera and conjunctiva were normal [PERRL With Normal Accommodation] : pupils were equal in size, round, and reactive to light [Extraocular Movements] : extraocular movements were intact [Oropharynx] : the oropharynx was normal [] : no respiratory distress [Exaggerated Use Of Accessory Muscles For Inspiration] : no accessory muscle use [Auscultation Breath Sounds / Voice Sounds] : lungs were clear to auscultation bilaterally [Heart Rate And Rhythm] : heart rate was normal and rhythm regular [Heart Sounds] : normal S1 and S2 [Murmurs] : no murmurs [Edema] : there was no peripheral edema [Abdomen Soft] : soft [Abdomen Tenderness] : non-tender [No Spinal Tenderness] : no spinal tenderness [No Focal Deficits] : no focal deficits [Oriented To Time, Place, And Person] : oriented to person, place, and time [Impaired Insight] : insight and judgment were intact [FreeTextEntry1] : No synovitis, knees cool to touch

## 2025-03-03 NOTE — ASSESSMENT
[FreeTextEntry1] : 47F PMH psoriasis and PsA here for follow up  #psoriatic arthritis -dx 2015 with diffuse joint pains. Pt was on stelara 2018 but only received 2 doses, pt reports improvement while on stelara -knee XR 9/2024 with maintained joint spaces, no erosions. Hip XR 9/2024 with preserved sacroiliac joints -Now back on stelara, first dose 10/28, second dose 12/2 -Psoriasis overall improved, joint pains improved except for L knee. Pt unable to go to [facility] PT due to distance.  Plan: - Unable to administer 3rd dose of stelara due to pt having skin infection - pt to return in 2 weeks after finishing course of abx and seeing gen surg for possible drainage - PT handout/home exercises printed and provided to patient  #abd pain and nausea -Unlikely related to stelara, has been ongoing for 3 months. Possibly gastroparesis? Abd US with hepatic steatosis -F/U primary care  #abscess  -Has abscess in pubic area, prescribed cephalexin for 5 days and gen surg referral. Discussed with pt she can also do warm compressses at  home. Will give stelara after infection clears  RTC 2 weeks Case discussed with Dr. Vincent Nava  Rheumatology Fellow

## 2025-03-03 NOTE — HISTORY OF PRESENT ILLNESS
[Skin Lesions] : skin lesions [Arthralgias] : arthralgias [Morning Stiffness] : morning stiffness [Visual Changes] : visual changes [Dry Eyes] : dry eyes [FreeTextEntry1] : 47F PMH psoriasis/PsA here for follow up.  Dx PsA 2015, had arthritis in hands, wrists, ankles, knees, tendonitis, nail pitting Was on MTX, per patient no improvement. Then trialed on stelara around 2355-7574, however pt has only received two doses. States that her symptoms improved while on stelara.  Pt reports joint pain in L knee, lower back and intermittently on ankles. Pain used to be intermittent but now more constant and has been using motrin. Endorses stiffness but does not last. No eye pain but had pain in back of eye, had optho visit 7/2024 and was told she has dry eyes. No IBD symptoms.  10/14/2024: Pt reports similar joint pains as previously, especially L knee and elbows. Has been taking motrin, was not able to afford diclofenac. Psoriasis improving on steroid ointment. Denies fevers, cough, SOB, abd pain. Has not received stelara yet but received letter stating she was approved for medication. Discussed with pharmacist Deedee during visit, pt should be receiving a call today about shipment.  10/28: Pt presents for stelara injection. Has L knee medial side pain, R elbow pain, and lower back pain  12/2: Pt states she went to ED for L knee pain and L toe pain. Knee XR normal, foot XR showed mild arthosis of foot in navicular and cuneiform. Found to have ingrown big toe nail and infection, given cefadroxil for a week and naproxen for pain. Other than L knee, overall joint pain and skin improved  3/3/25: has multple concerns, since 12/2024 has been having nausea and few episodes of vomiting, poor po intake. Feels chills but has not had fevers, Has abd pain, no diarrhea, has chronic constipation. Also has been having diffuse HA since then, has blurry vision for 2 yrs, no scalp tenderness or jaw claudication. Psoriatic rash is improved. Has joint pain in L knee, all the time, worse with activity and also when lyind down. Started to have pain on L thigh, Injects insulin periumbilical area. Has a bump on upper pubic area that is growing in size [Anorexia] : no anorexia [Weight Loss] : no weight loss [Malaise] : no malaise [Fever] : no fever [Chills] : no chills [Fatigue] : no fatigue [Depression] : no depression [Malar Facial Rash] : no malar facial rash [Skin Nodules] : no skin nodules [Oral Ulcers] : no oral ulcers [Cough] : no cough [Dry Mouth] : no dry mouth [Dysphonia] : no dysphonia [Dysphagia] : no dysphagia [Shortness of Breath] : no shortness of breath [Chest Pain] : no chest pain [Joint Swelling] : no joint swelling [Joint Warmth] : no joint warmth [Joint Deformity] : no joint deformity [Decreased ROM] : no decreased range of motion [Falls] : no falls [Difficulty Standing] : no difficulty standing [Difficulty Walking] : no difficulty walking [Dyspnea] : no dyspnea [Myalgias] : no myalgias [Muscle Weakness] : no muscle weakness [Muscle Spasms] : no muscle spasms [Muscle Cramping] : no muscle cramping [Eye Pain] : no eye pain [Eye Redness] : no eye redness

## 2025-03-04 NOTE — ASSESSMENT
[FreeTextEntry1] : The plan of care was discussed with the patient in full detail. She verbalized understanding and in agreement with the plan of care.  RTC in 3 months or early if needed.

## 2025-03-04 NOTE — PHYSICAL EXAM
[Normal] : no acute distress, well nourished, well developed and well-appearing [No Respiratory Distress] : no respiratory distress  [No Accessory Muscle Use] : no accessory muscle use [Clear to Auscultation] : lungs were clear to auscultation bilaterally [Normal Rate] : normal rate  [Regular Rhythm] : with a regular rhythm [Normal S1, S2] : normal S1 and S2 [Soft] : abdomen soft [Non-distended] : non-distended

## 2025-03-04 NOTE — HISTORY OF PRESENT ILLNESS
[FreeTextEntry1] : Follow up visit for chronic medical conditions. [de-identified] : The patient is a 46-year-old F with PMH significant for T2DM, psoriasis, and hypotension who presents today for a follow up visit for chronic medical conditions.   # Last visit endorsed RLQ pain. Patient was sent for Abdomen and pelvic US. Today she denies abdominal pain, denies N/V/D.  She would like to discuss Abdominal US results. Abdominal and pelvic US- Hepatic steatosis and right periovarian cyst.  #T2DM- Following Endo. meds were adjusted recently by endo  A1c 10/2024- 7.5 Endorsed medication compliance w/ basaglar- 38units at bedtime Premeal insulin- Humalog 30before breakfast/18before lunch /18 before dinner. Metformin 1000 BID Trulicity 1.5 mg once a week- endorsed compliance.  # Pubic area abscess- Patient was prescribed Keflex by Rheumatology and was given general sx referral. She reports that she does not have insurance and therefore she is unable to purchase antibiotics. Will reach out to [facility] to assist the patient in getting ABx via [facility].

## 2025-03-19 NOTE — HISTORY OF PRESENT ILLNESS
[Improved Health] : Improved health [Quality of Life] : Quality of life [Improved Mobility] : Improved mobility [Cut/Track Calories] : cut/track calories [Low Carb Diet (Atkins/Keto)] : low carb diet (Atkins/Keto) [Poor eating habits] : poor eating habits [Cravings] : cravings [Work stress] : work stress [Medical conditions] : medical conditions [Medications] : medications [I usually sleep 4-6 hours] : I usually sleep 4-6 hours [I snore] : I snore [Breakfast] : breakfast [Lunch] : lunch [Dinner] : dinner [Don't snack] : don't snack [Less than 1] : Dairy: less than 1 [1-2] : Meat, fish, poultry, eggs, cheese: 1-2 [2] : Fast food - meals per week: 2 [7] : How many cups of water do you regularly drink per day: 7 [1] : Cups of coffee per day: 1 [Milk] : milk [Sugar] : sugar [Self] : self [Overlarge portions] : overlarge portions [2 blocks] : Walking distance capability:2 blocks [Walking] : walking [0] : 0 [None] : none [T2DM] : T2DM [Trulicity] : Trulicity [FreeTextEntry3] : 175 [] : No [FreeTextEntry1] : 47 year old woman with a hx of DM, HTN, HLD with difficulty with wt loss  Breakfast waffle with a little jam coffee with milk and 2t sugar or  bun with cheese and meat: [place] coffee  snack none  Lunch baked chicken, rice and beans drink: gatorade dessert: none   dinner: 7-8 grilled zucchini fried cheese drink: water or  potato with veggies  dessert; none   exer none etoh none eating out 1-2 x aweek fried, foods

## 2025-03-19 NOTE — ASSESSMENT
[FreeTextEntry1] : 47 year old  woman with a history of DM on insulin and trulicity( cannot tolerate more than 1.5 mg as it made her suicidal).  Now complains of her stomach hurting  after eating a small amount, did not hurt this morning after eating the waffle( ate it as she thought it would be the last time she could ever eat it )  Last night she ate the zucchini and fried cheese and had the pain. She usually has constipation. Taking nothing  Plan 1. Start colace:  2 eat < 7 pm 3. vegetables with lunch and dinner 4 no sugary drinks 5 one t in coffee only 6 increase the sleep:  goal initially will be 6 hours with 2 hours after eating 7 start walking 8 consider wellbutrin in the future, flat affect.  9 no fried foods if the stomach pain continues she needs to see GI  4-5 weeks  60 min

## 2025-03-25 NOTE — ADDENDUM
[FreeTextEntry1] : recurrent sebacous cyst. recommend lifestyle modification and improvement of hg a1c prior to elective cyst removal. F/u 3 months after diet and exercise

## 2025-03-25 NOTE — PHYSICAL EXAM
[Normal Breath Sounds] : Normal breath sounds [Normal Rate and Rhythm] : normal rate and rhythm [Alert] : alert [Oriented to Person] : oriented to person [Oriented to Place] : oriented to place [Oriented to Time] : oriented to time [Calm] : calm [de-identified] : NAD [de-identified] : soft, NT, ND, obese [de-identified] : 2 cm round lump on anterior pubis, nontender to palpation, non-erythematous, no drainage

## 2025-03-25 NOTE — HISTORY OF PRESENT ILLNESS
[de-identified] : 47 F PMH DM, HLD, psoriasis p/w recurrent "lumps" on body, currently one on anterior pubis. Per patient, she has had a long hx of these lumps popping up on various parts of her body - in the past has had on neck, back, nose, etc. This current lump present for 1 month. Was getting larger and painful, and then it popped and drained pus and blood, which caused relief. Has previously been seen by GYN for lumps on pubic area. Has been seen in ED for nasal lump in the past. Has taken abx for these issues in different areas in the past as well.   Denies fevers, chills, SOB, CP.

## 2025-03-25 NOTE — REASON FOR VISIT
[Language Line ] : provided by Language Line   [Time Spent: ____ minutes] : Total time spent using  services: [unfilled] minutes. The patient's primary language is not English thus required  services. [TWNoteComboBox1] : Tanzanian

## 2025-03-25 NOTE — PHYSICAL EXAM
[General Appearance - Alert] : alert [General Appearance - In No Acute Distress] : in no acute distress [Sclera] : the sclera and conjunctiva were normal [PERRL With Normal Accommodation] : pupils were equal in size, round, and reactive to light [Extraocular Movements] : extraocular movements were intact [Oropharynx] : the oropharynx was normal [] : no respiratory distress [Exaggerated Use Of Accessory Muscles For Inspiration] : no accessory muscle use [Auscultation Breath Sounds / Voice Sounds] : lungs were clear to auscultation bilaterally [Heart Rate And Rhythm] : heart rate was normal and rhythm regular [Heart Sounds] : normal S1 and S2 [Murmurs] : no murmurs [Edema] : there was no peripheral edema [Abdomen Soft] : soft [Abdomen Tenderness] : non-tender [No Spinal Tenderness] : no spinal tenderness [No Focal Deficits] : no focal deficits [Oriented To Time, Place, And Person] : oriented to person, place, and time [Impaired Insight] : insight and judgment were intact [FreeTextEntry1] : Psoriatic rash significantly improved. Mild nail pitting L 4th fingernail. Abscess appearing area on pubic area, now without pus and appears more deep. Erythematous macule on nose and hyperpigmented macules on scalp

## 2025-03-25 NOTE — ASSESSMENT
[FreeTextEntry1] : 47 F PMH DM, HLD, psoriasis p/w recurrent "lumps" on body, currently one on anterior pubis. Patient likely currently with sebaceous cyst, recurring 2/2 poorly controlled diabetes.

## 2025-03-25 NOTE — ASSESSMENT
[FreeTextEntry1] : 47F PMH psoriasis and PsA here for follow up  #psoriatic arthritis -dx 2015 with diffuse joint pains. Pt was on stelara 2018 but only received 2 doses, pt reports improvement while on stelara -knee XR 9/2024 with maintained joint spaces, no erosions. Hip XR 9/2024 with preserved sacroiliac joints -Now back on stelara, first dose 10/28, second dose 12/2 -Psoriasis overall improved, joint pains improved except for L knee. Pt unable to go to [facility] PT due to distance.  Plan: - Unable to administer 3rd dose of stelara due to pt having skin infection. S/p 5 days of doxycycline and warm compresses, improving on exam but has a small nodular abscess below the skin - Pt has gen surg appt 3/25. Will follow up after visit and plan on stelara after I&D -Pt needs monitoring labs, stated she will have it done next appt  #abscess  -Has abscess in pubic area, as above. Will give stelara after infection clears  RTC this week vs next week for stelara injection, then 12 weeks after Case discussed with Dr. Vincent Nava  Rheumatology Fellow

## 2025-03-25 NOTE — HISTORY OF PRESENT ILLNESS
[FreeTextEntry1] : 47F PMH psoriasis/PsA here for follow up.  Dx PsA 2015, had arthritis in hands, wrists, ankles, knees, tendonitis, nail pitting Was on MTX, per patient no improvement. Then trialed on stelara around 7182-7776, however pt has only received two doses. States that her symptoms improved while on stelara.  Pt reports joint pain in L knee, lower back and intermittently on ankles. Pain used to be intermittent but now more constant and has been using motrin. Endorses stiffness but does not last. No eye pain but had pain in back of eye, had optho visit 7/2024 and was told she has dry eyes. No IBD symptoms.  10/14/2024: Pt reports similar joint pains as previously, especially L knee and elbows. Has been taking motrin, was not able to afford diclofenac. Psoriasis improving on steroid ointment. Denies fevers, cough, SOB, abd pain. Has not received stelara yet but received letter stating she was approved for medication. Discussed with pharmacist Deedee during visit, pt should be receiving a call today about shipment.  10/28: Pt presents for stelara injection. Has L knee medial side pain, R elbow pain, and lower back pain  12/2: Pt states she went to ED for L knee pain and L toe pain. Knee XR normal, foot XR showed mild arthosis of foot in navicular and cuneiform. Found to have ingrown big toe nail and infection, given cefadroxil for a week and naproxen for pain. Other than L knee, overall joint pain and skin improved  3/3/25: has multple concerns, since 12/2024 has been having nausea and few episodes of vomiting, poor po intake. Feels chills but has not had fevers, Has abd pain, no diarrhea, has chronic constipation. Also has been having diffuse HA since then, has blurry vision for 2 yrs, no scalp tenderness or jaw claudication. Psoriatic rash is improved. Has joint pain in L knee, all the time, worse with activity and also when lyind down. Started to have pain on L thigh, Injects insulin periumbilical area. Has a bump on upper pubic area that is growing in size  3/24/25: Pt took doxycycline for 5 days and used warm compresses on abscess. Abscess started draining, and is smaller. Has rash returning on face and scalp. Joint pains are improved.

## 2025-03-25 NOTE — HISTORY OF PRESENT ILLNESS
[de-identified] : 47 F PMH DM, HLD, psoriasis p/w recurrent "lumps" on body, currently one on anterior pubis. Per patient, she has had a long hx of these lumps popping up on various parts of her body - in the past has had on neck, back, nose, etc. This current lump present for 1 month. Was getting larger and painful, and then it popped and drained pus and blood, which caused relief. Has previously been seen by GYN for lumps on pubic area. Has been seen in ED for nasal lump in the past. Has taken abx for these issues in different areas in the past as well.   Denies fevers, chills, SOB, CP.

## 2025-03-25 NOTE — PLAN
[FreeTextEntry1] : - No acute surgical intervention - Can offer excision of cyst, however, will continue to recur - Recommend continued follow up with medicine/endocrine to improve diabetes - Recommend continued to follow up with obesity medicine

## 2025-03-25 NOTE — PHYSICAL EXAM
[Normal Breath Sounds] : Normal breath sounds [Normal Rate and Rhythm] : normal rate and rhythm [Alert] : alert [Oriented to Person] : oriented to person [Oriented to Place] : oriented to place [Oriented to Time] : oriented to time [Calm] : calm [de-identified] : NAD [de-identified] : soft, NT, ND, obese [de-identified] : 2 cm round lump on anterior pubis, nontender to palpation, non-erythematous, no drainage

## 2025-03-25 NOTE — REASON FOR VISIT
[Language Line ] : provided by Language Line   [Time Spent: ____ minutes] : Total time spent using  services: [unfilled] minutes. The patient's primary language is not English thus required  services. [TWNoteComboBox1] : Peruvian

## 2025-03-25 NOTE — HISTORY OF PRESENT ILLNESS
[FreeTextEntry1] : 47F PMH psoriasis/PsA here for follow up.  Dx PsA 2015, had arthritis in hands, wrists, ankles, knees, tendonitis, nail pitting Was on MTX, per patient no improvement. Then trialed on stelara around 5113-6960, however pt has only received two doses. States that her symptoms improved while on stelara.  Pt reports joint pain in L knee, lower back and intermittently on ankles. Pain used to be intermittent but now more constant and has been using motrin. Endorses stiffness but does not last. No eye pain but had pain in back of eye, had optho visit 7/2024 and was told she has dry eyes. No IBD symptoms.  10/14/2024: Pt reports similar joint pains as previously, especially L knee and elbows. Has been taking motrin, was not able to afford diclofenac. Psoriasis improving on steroid ointment. Denies fevers, cough, SOB, abd pain. Has not received stelara yet but received letter stating she was approved for medication. Discussed with pharmacist Deedee during visit, pt should be receiving a call today about shipment.  10/28: Pt presents for stelara injection. Has L knee medial side pain, R elbow pain, and lower back pain  12/2: Pt states she went to ED for L knee pain and L toe pain. Knee XR normal, foot XR showed mild arthosis of foot in navicular and cuneiform. Found to have ingrown big toe nail and infection, given cefadroxil for a week and naproxen for pain. Other than L knee, overall joint pain and skin improved  3/3/25: has multple concerns, since 12/2024 has been having nausea and few episodes of vomiting, poor po intake. Feels chills but has not had fevers, Has abd pain, no diarrhea, has chronic constipation. Also has been having diffuse HA since then, has blurry vision for 2 yrs, no scalp tenderness or jaw claudication. Psoriatic rash is improved. Has joint pain in L knee, all the time, worse with activity and also when lyind down. Started to have pain on L thigh, Injects insulin periumbilical area. Has a bump on upper pubic area that is growing in size  3/24/25: Pt took doxycycline for 5 days and used warm compresses on abscess. Abscess started draining, and is smaller. Has rash returning on face and scalp. Joint pains are improved.

## 2025-03-25 NOTE — DATA REVIEWED
[FreeTextEntry1] : Laboratory and radiology studies that were personally reviewed at today's visit are summarized in above and below:

## 2025-03-25 NOTE — ASSESSMENT
[FreeTextEntry1] : 47F PMH psoriasis and PsA here for follow up  #psoriatic arthritis -dx 2015 with diffuse joint pains. Pt was on stelara 2018 but only received 2 doses, pt reports improvement while on stelara -knee XR 9/2024 with maintained joint spaces, no erosions. Hip XR 9/2024 with preserved sacroiliac joints -Now back on stelara, first dose 10/28, second dose 12/2 -Psoriasis overall improved, joint pains improved except for L knee. Pt unable to go to [facility] PT due to distance.  Plan: - Unable to administer 3rd dose of stelara due to pt having skin infection. S/p 5 days of doxycycline and warm compresses, improving on exam but has a small nodular abscess below the skin - Pt has gen surg appt 3/25. Will follow up after visit and plan on stelara after I&D -Pt needs monitoring labs, stated she will have it done next appt  #abscess  -Has abscess in pubic area, as above. Will give stelara after infection clears  RTC this week vs next week for stelara injection, then 12 weeks after Case discussed with Dr. Vincent Nava  Rheumatology Fellow

## 2025-05-14 NOTE — HISTORY OF PRESENT ILLNESS
[FreeTextEntry1] : F/u obesity medicine   Time: 630-8 AM Breakfast: 2 hard boiled eggs w/ boiled beans  Coffee w/ 1 spoon of sugar and milk   Time: 1-3 PM  Lunch: Grilled meat, beans and rice (1 spoonful)  Water   Time: 7-8 pm  Dinner: skips dinner the last couple of times in order to sleep earlier and eat less Grilled corn and mozzarella cheese  Water   Snack: does not snack much, usually fruits if anything (kelli, apples) 1/2 serving   Eating out: 2x per week, eats grilled meats/chicken, soups Sleep: sleeping around 7-8 pm and waking up around 3-4 AM, 7-8 hours per night  Exercise: having a tough time with exercise, but her feet hurt a lot. Walking 10 minutes or so at a time. Pain in her hips/knees intermittently. Walks 5 days a week at a minimum  Alcohol: none

## 2025-05-14 NOTE — INTERPRETER SERVICES
[Language Line ] : provided by Language [employer]   [Interpreters_IDNumber] : [id number] [Interpreters_FullName] : Deedee  [TWNoteComboBox1] : Vietnamese

## 2025-05-14 NOTE — ASSESSMENT
[FreeTextEntry1] : #Type I Obesity  - Maintenance of weight +/- 1 pound, on Trulicity 1.5 mg (cannot tolerate higher than this)  - Making some lifestyle modifications. no more juice/Gatorade, eating multiple sources of complex carbs (potatoes, beans, rice)  - Walking for exercise 5x per week, encouraged to continue exercise and increase intervals per day as pt can only tolerate 10 minutes walking per day due to joint pain  - Encouraged to eat more green leafy vegetables and cruciferous vegetables  - No alcohol, limited take out weekly, sleeping much more than before 7-8 hours nightly  - Will start Wellbutrin 150 qAM  - F/u 5 weeks  will add A1c at that time, consider SGLT2 to be able to back off the insulin

## 2025-05-27 NOTE — END OF VISIT
[] : Fellow [FreeTextEntry3] : 47F with T2DM inadequately controlled primarily overnight and fasting. Will increase Basaglar to 36 units.  Hx of 2 subcentimeter  Rt. lobe thyroid nodules, one 7 mm spongiform nodule that does not require follow up and one 8 mm nodule with coarse calcifications. Will repeat thyroid ultrasound..

## 2025-05-27 NOTE — ASSESSMENT
[FreeTextEntry1] : 47F with T2DM uncontrolled on insulin, HTN, HLD, thyroid nodules presents for follow up visit  #T2DM, uncontrolled A1C today 8% Current Regimen: Basaglar 28-30? units QHs, Humalog 28-30 units with breakfast, 18-20 units with lunch and dinner, metformin 1000mg BID, Trulicity 1.5mg sq weekly PLAN: - Increase basaglar to 36 units qhs - continue humalog 28-30 units before breakfast, 18-20 units before lunch/dinner. Instructed to take 14 units for dinner that is lighter than usual. - Continue Trulicity 1.5 mg once weekly on Sundays. Could not tolerate higher dose - Continue Metformin 1000 mg BID (on [facility]) - in the future can consider addition of pioglitazone but will hold off for now as patient will be starting wellbutrin with obesity medicine now - hx of UTI, vaginal sx - avoid SGLT2i - continue freestyle emma 2 sensor. instructed to confirm hypoglycemic readings with FS. - Labs next visit - Log fingersticks and bring to next visit  #HLD / hypertriglyceridemia: LDL above goal (86 in Sep 2024) - Continue rosuvastatin 40mg qhs  - Continue Zetia 10 mg once daily qhs  #SubCM R. Thyroid nodules Feb 2022: Right 0.79 cm mixed nodule, 7mm left spongiform nodule - repeat US Thyroid, instructed patient to schedule  #low BP with AM cortisol in indeterminant range (6.8 >> 6.1) stim test performed Jan 2023, robust response. Baseline cortisol 24 30 mins post cosyntropin Cortisol 21 - no further workup needed   RTC in 3 months    Case discussed with Dr. Lety Martinez MD Endocrinology fellow

## 2025-05-27 NOTE — REASON FOR VISIT
[Follow - Up] : a follow-up visit [Language Line ] : provided by Language Line   [Time Spent: ____ minutes] : Total time spent using  services: [unfilled] minutes. The patient's primary language is not English thus required  services. [Interpreters_IDNumber] : [id number]  [TWNoteComboBox1] : Azerbaijani

## 2025-05-27 NOTE — HISTORY OF PRESENT ILLNESS
[FreeTextEntry1] : 47F with T2DM, previously uncontrolled requiring insulin, HTN, HLD, prior low AM cortisol level, and thyroid nodules, presents for follow up.   ID: [id number] utilized for this encounter  #T2DM Diagnosed Oct 2021 when A1c was 12% after being evaluated for ACS r/o (not ACS).  A1c: 8.9% 2/2025 --> 8% 5/27/2025  Current regimen: Basaglar 28-30 units qhs (was not exactly sure what dose), Humalog 28-30 units before breakfast, 18-20 units before lunch and dinner, Trulicity 1.5 mg once weekly, metformin 1,000 mg BID. Also started wellbutrin 150mg daily with obesity medicine.  Past medications: She tried the Trulicity 3.0 mg once weekly previously but she could not tolerate due to adverse effects. Had suicidal ideation that went away when she went back to the 1.5 mg once weekly dose.   Started freestyle emma 2 sensor. 5/14-27/25 GMI 8.1%. Avg glucose 200.  Target range 36%, high 51%, very high 13%, 0% low and very low Not much posprandial spikes. Fasting BG around 190-200s. Reports one episode of having low sugar to 30s with hypoglycemia symptoms  Diet: B: Eggs, coffee, beans, vegetables L: roasted chicken or stir-velasquez meats or fish. Adds salad or avocado. No rice or tortilla. D: fruits (piece of apple, kelli, strawberries, blueberries, kiwi) and vegetables   Complications: No neuropathy or macrovascular complications She states that she saw an ophthalmologist within the year and was told that she does not have diabetic retinopathy.  Last UACR 2/2024 was negative.  #HLD / hypertriglyceridemia: Taking rosuvastatin 40mg qhs, ezetia 10mg daily LDL 86 in 9/2024  #Previously abnormal AM cortisol had soft/low blood pressure/orthostatic hypotension in the hospital, started on midodrine in Oct/Nov 2021 AM Cortisol level 6.8 in hospital, repeat outpatient was 6.1 Stim test in Jan 2023 was robust, ruling out AI  #Thyroid nodule US in May 2022 revealed small Right sided thyroid nodules, RMP 8mm nodule w/ coarse calcification and RLP 7mm spongiform nodule Last TSH in 4/2023 normal No prior personal or family hx of Thyroid CA or other head/neck cancers says she recalls being informed about this many years ago, not while in this country. no prior hx of thyroid hormone abnormalities No hx of Head/neck RT Had hx of dysphagia symptoms, possibly GERD related.

## 2025-05-27 NOTE — REVIEW OF SYSTEMS
[TextEntry] : REVIEW OF SYSTEMS: General: No fatigue, no weight gain, no weight loss Respiratory: +dry throat and pain. No cough, no shortness of breath  Cardiovascular: No chest pain, no palpitations, no leg swelling  Gastrointestinal: No nausea, no vomiting, no constipation, no diarrhea  Musculoskeletal: No muscle aches, no joint pain, no recent fractures Neurologic: No tremors, no syncopal episodes Psychiatric: The patient is not currently nervous or anxious  Endocrine: No thyroid/neck swelling, no heat/cold intolerance   The review of systems is otherwise negative except as noted in HPI.

## 2025-06-02 NOTE — ASSESSMENT
[FreeTextEntry1] : 47F PMH psoriasis and PsA here for follow up  #psoriatic arthritis -dx 2015 with diffuse joint pains. Pt was on stelara 2018 but only received 2 doses, pt reports improvement while on stelara -knee XR 9/2024 with maintained joint spaces, no erosions. Hip XR 9/2024 with preserved sacroiliac joints -Now back on stelara, first dose 10/28/24, second dose 12/2/24 -Psoriasis overall improved, joint pains improved except for L knee. Pt unable to go to [facility] PT due to distance.  Plan: - Pt forgot to bring stelara for injection today 6/2. Discussed with patient and Internal medicine team(Dr. Logan), pt will bring stelara to IM appointment 6/4 and injection with be given at that visit. Pt is cleared to receive stelara injection. -Pt needs CBC, CMP, ESR, CRP, labs ordered and pt will obtain them on appt 6/4   RTC 3 months Case discussed with Dr. Vincent Nava  Rheumatology Fellow

## 2025-06-02 NOTE — PHYSICAL EXAM
[General Appearance - Alert] : alert [General Appearance - In No Acute Distress] : in no acute distress [Sclera] : the sclera and conjunctiva were normal [PERRL With Normal Accommodation] : pupils were equal in size, round, and reactive to light [Extraocular Movements] : extraocular movements were intact [Oropharynx] : the oropharynx was normal [] : no respiratory distress [Exaggerated Use Of Accessory Muscles For Inspiration] : no accessory muscle use [Auscultation Breath Sounds / Voice Sounds] : lungs were clear to auscultation bilaterally [Heart Rate And Rhythm] : heart rate was normal and rhythm regular [Heart Sounds] : normal S1 and S2 [Murmurs] : no murmurs [Edema] : there was no peripheral edema [Abdomen Soft] : soft [Abdomen Tenderness] : non-tender [No Spinal Tenderness] : no spinal tenderness [No Focal Deficits] : no focal deficits [Oriented To Time, Place, And Person] : oriented to person, place, and time [Impaired Insight] : insight and judgment were intact [FreeTextEntry1] : Psoriatic rash significantly improved. Mild nail pitting L 4th fingernail.  hyperpigmented macules on scalp

## 2025-06-02 NOTE — REASON FOR VISIT
[Time Spent: ____ minutes] : Total time spent using  services: [unfilled] minutes. The patient's primary language is not English thus required  services. [Interpreters_IDNumber] : [id number] [TWNoteComboBox1] : Guamanian

## 2025-06-02 NOTE — HISTORY OF PRESENT ILLNESS
[FreeTextEntry1] : 47F PMH psoriasis/PsA here for follow up.  Dx PsA 2015, had arthritis in hands, wrists, ankles, knees, tendonitis, nail pitting Was on MTX, per patient no improvement. Then trialed on stelara around 6070-2534, however pt has only received two doses. States that her symptoms improved while on stelara.  Pt reports joint pain in L knee, lower back and intermittently on ankles. Pain used to be intermittent but now more constant and has been using motrin. Endorses stiffness but does not last. No eye pain but had pain in back of eye, had optho visit 7/2024 and was told she has dry eyes. No IBD symptoms.  10/14/2024: Pt reports similar joint pains as previously, especially L knee and elbows. Has been taking motrin, was not able to afford diclofenac. Psoriasis improving on steroid ointment. Denies fevers, cough, SOB, abd pain. Has not received stelara yet but received letter stating she was approved for medication. Discussed with pharmacist Deedee during visit, pt should be receiving a call today about shipment.  10/28: Pt presents for stelara injection. Has L knee medial side pain, R elbow pain, and lower back pain  12/2: Pt states she went to ED for L knee pain and L toe pain. Knee XR normal, foot XR showed mild arthosis of foot in navicular and cuneiform. Found to have ingrown big toe nail and infection, given cefadroxil for a week and naproxen for pain. Other than L knee, overall joint pain and skin improved  3/3/25: has multple concerns, since 12/2024 has been having nausea and few episodes of vomiting, poor po intake. Feels chills but has not had fevers, Has abd pain, no diarrhea, has chronic constipation. Also has been having diffuse HA since then, has blurry vision for 2 yrs, no scalp tenderness or jaw claudication. Psoriatic rash is improved. Has joint pain in L knee, all the time, worse with activity and also when lyind down. Started to have pain on L thigh, Injects insulin periumbilical area. Has a bump on upper pubic area that is growing in size  3/24/25: Pt took doxycycline for 5 days and used warm compresses on abscess. Abscess started draining, and is smaller. Has rash returning on face and scalp. Joint pains are improved.  6/2025: Pt states she has pain b/l shoulders and L knee. Psoriatic rash is improved, has minor rash on scalp. Sebaceous cyst in pubic area resolved

## 2025-06-04 NOTE — PHYSICAL EXAM
[Normal] : no acute distress, well nourished, well developed and well-appearing [Normal Sclera/Conjunctiva] : normal sclera/conjunctiva [PERRL] : pupils equal round and reactive to light [EOMI] : extraocular movements intact [Supple] : supple [No Respiratory Distress] : no respiratory distress  [No Accessory Muscle Use] : no accessory muscle use [Clear to Auscultation] : lungs were clear to auscultation bilaterally [Normal Rate] : normal rate  [Regular Rhythm] : with a regular rhythm [Normal S1, S2] : normal S1 and S2 [Soft] : abdomen soft [Non Tender] : non-tender [Non-distended] : non-distended [Normal Affect] : the affect was normal [Alert and Oriented x3] : oriented to person, place, and time [Normal Mood] : the mood was normal

## 2025-06-04 NOTE — REVIEW OF SYSTEMS
[Itching] : itching [Nasal Discharge] : nasal discharge [Sore Throat] : sore throat [Joint Pain] : joint pain [Negative] : Gastrointestinal

## 2025-06-06 NOTE — HISTORY OF PRESENT ILLNESS
[FreeTextEntry1] : Follow up visit for chronic medical conditions.  [de-identified] : The patient is a 46-year-old F with PMH significant for T2DM, psoriasis, and hypotension who presents today for a follow up visit for chronic medical conditions.   #Patient reports that she had flu like s/s 2 months ago with runny nose, sore throat and since then that she has sensitivity in her throat. She describes the sensation as pain in her throat that comes and goes associated with the feeling that something is stuck in her throat. She denies dysphagia, denies dysphonia, denies odynophagia, denies SOB, denies cough, and denies wheezing.  she is interested to see the ENT.  # she is also c/o watery and itchy eyes and runny nose for the past 3 months. not taking allergy medication.    #T2DM- Following Endo. meds were adjusted recently by endo  A1c 05/2025- 8.0 Endorsed medication compliance w/ basaglar- 36units at bedtime Premeal insulin- Humalog 28-30before breakfast/18-20before lunch /14 before dinner. Metformin 1000 BID Trulicity 1.5 mg once a week- endorsed compliance.

## 2025-06-06 NOTE — HISTORY OF PRESENT ILLNESS
[FreeTextEntry1] : Follow up visit for chronic medical conditions.  [de-identified] : The patient is a 46-year-old F with PMH significant for T2DM, psoriasis, and hypotension who presents today for a follow up visit for chronic medical conditions.   #Patient reports that she had flu like s/s 2 months ago with runny nose, sore throat and since then that she has sensitivity in her throat. She describes the sensation as pain in her throat that comes and goes associated with the feeling that something is stuck in her throat. She denies dysphagia, denies dysphonia, denies odynophagia, denies SOB, denies cough, and denies wheezing.  she is interested to see the ENT.  # she is also c/o watery and itchy eyes and runny nose for the past 3 months. not taking allergy medication.    #T2DM- Following Endo. meds were adjusted recently by endo  A1c 05/2025- 8.0 Endorsed medication compliance w/ basaglar- 36units at bedtime Premeal insulin- Humalog 28-30before breakfast/18-20before lunch /14 before dinner. Metformin 1000 BID Trulicity 1.5 mg once a week- endorsed compliance.

## 2025-06-18 NOTE — ASSESSMENT
[FreeTextEntry1] : #Obesity/DM/MAFLD - Feels more motivated to be more active now that she is moving more and getting out of the house, could be medication associated  - Diet is mostly unchanged from last visit, continues to be heavy in meats/starchy vegetables  - Pt seems to be consuming small portions, skips meals often  - Wellbutrin 150 mg ER, tolerating well w/o side effects  - Encouraged to increase vegetables in diet, more than half her plate w/ leafy greens, cruciferous, etc  - Limit starch intake (starchy vegetables, white rice, white bread, corn, etc)  - Pt currently walking outside when the weather is good, encouraged to get daily exercise and obtain exercise from the gym or other areas in her house  - Weight mostly unchanged from prior, pt on multiple weight increasing meds (gabapentin, steroids, insulin)  - Increase wellbutrin 300 mg ER QD  - F/u in 6-8 weeks   #Constipation  - Encouraged to get daily movement and activity  - Recommended to take miralax/senna nightly as needed for constipation  - Increase fiber in diet via vegetables/fruits or fiber supplement

## 2025-06-18 NOTE — END OF VISIT
[] : Resident [FreeTextEntry3] : Discussed encouraging eating a lot more vegetables, increasing her Wellbutrin to 300 and f/u in 6-8 weeks

## 2025-06-18 NOTE — INTERPRETER SERVICES
[Language Line ] : provided by [employer]   [Interpreters_IDNumber] : [id number] [Interpreters_FullName] : Opal  [TWNoteComboBox1] : Moroccan

## 2025-06-18 NOTE — HISTORY OF PRESENT ILLNESS
[FreeTextEntry1] : F/u obesity medicine   Pt reporting headache and abdominal pain and headache x 2 days. Endorsing nausea but no vomiting. Stabbing pain, bilateral on both sides. Not associated with eating. Denies diarrhea, endorses constipation. Last BM yesterday. Has to push to have a BM. Sometimes BM is very small amount. Last normal BM was sometime last week. Over a week ago.   Breakfast - 9/930 AM  2 egg omelet with 0.5 tortilla, only had 1/2 the omelet Had with coffee w/ 1 teaspoon sugar and some milk   Lunch - 4 pm  Grilled vegetables and steak (carrots, bell peppers, celery)  Drank small amount of coconut water   Dinner - 5-7 pm  Did not eat dinner last night  Normally will have eggs, cheese, fruit or the same thing she had for lunch  Drinks water with this meal   Snack/desert - sometimes snacks, between either meal but will vary. Snacks on fruits (kelli, blueberries, watermelon, apples) usually eats a few bites if anything   Eating out - 1x per week, grilled chicken, shrimp, grilled meats, salads and vegetables  Alcohol - none  Sleep - 8-9 hours on average  Exercise - usually walks but has not been recently due to the rain, usually walks for over an hour 1x per day.

## 2025-06-18 NOTE — PHYSICAL EXAM
[Normal] : affect appropriate [Obese] : obese [Obese, well nourished, in no acute distress] : obese, well nourished, in no acute distress [de-identified] : + BS, + mild TTP, non-distended